# Patient Record
Sex: MALE | Race: WHITE | NOT HISPANIC OR LATINO
[De-identification: names, ages, dates, MRNs, and addresses within clinical notes are randomized per-mention and may not be internally consistent; named-entity substitution may affect disease eponyms.]

---

## 2018-02-23 ENCOUNTER — LAB SERVICES (OUTPATIENT)
Dept: OTHER | Age: 78
End: 2018-02-23

## 2018-03-01 ENCOUNTER — LAB SERVICES (OUTPATIENT)
Dept: OTHER | Age: 78
End: 2018-03-01

## 2018-03-01 ENCOUNTER — CHARTING TRANS (OUTPATIENT)
Dept: OTHER | Age: 78
End: 2018-03-01

## 2018-03-01 ENCOUNTER — IMAGING SERVICES (OUTPATIENT)
Dept: OTHER | Age: 78
End: 2018-03-01

## 2018-03-01 LAB
ALBUMIN SERPL BCG-MCNC: 4.2 G/DL (ref 3.6–5.1)
ALP SERPL-CCNC: 64 U/L (ref 30–130)
ALT SERPL W/O P-5'-P-CCNC: 20 U/L (ref 10–35)
AP REPORT: NORMAL
AST SERPL-CCNC: 15 U/L (ref 9–37)
BASOPHIL %: 1.1 % (ref 0–1.2)
BASOPHIL ABSOLUTE #: 0.1 10*3/UL (ref 0–0.1)
BILIRUB SERPL-MCNC: 0.7 MG/DL (ref 0–1)
BILIRUBIN URINE: NEGATIVE
BLOOD URINE: ABNORMAL
BUN SERPL-MCNC: 20 MG/DL (ref 6–27)
CALCIUM SERPL-MCNC: 9.6 MG/DL (ref 8.6–10.6)
CHLORIDE SERPL-SCNC: 98 MMOL/L (ref 96–107)
CLARITY: CLEAR
COLOR: YELLOW
CREATININE, SERUM: 1.2 MG/DL (ref 0.6–1.6)
DIFFERENTIAL TYPE: NORMAL
EOSINOPHIL %: 2.8 % (ref 0–10)
EOSINOPHIL ABSOLUTE #: 0.2 10*3/UL (ref 0–0.5)
GFR SERPL CREATININE-BSD FRML MDRD: 57 ML/MIN/{1.73M2}
GFR SERPL CREATININE-BSD FRML MDRD: >60 ML/MIN/{1.73M2}
GLUCOSE QUALITATIVE U: NEGATIVE
GLUCOSE SERPL-MCNC: 100 MG/DL (ref 70–200)
HCO3 SERPL-SCNC: 27 MMOL/L (ref 22–32)
HEMATOCRIT: 44.1 % (ref 40–51)
HEMOGLOBIN: 15.5 G/DL (ref 13.7–17.5)
INTERNATIONAL NORMALIZED RATIO: 0.9
KETONES, URINE: NEGATIVE
LEUKOCYTE ESTERASE URINE: NEGATIVE
LYMPH PERCENT: 21 % (ref 20.5–51.1)
LYMPHOCYTE ABSOLUTE #: 1.7 10*3/UL (ref 1.2–3.4)
MEAN CORPUSCULAR HGB CONCENTRATION: 35.1 % (ref 32–36)
MEAN CORPUSCULAR HGB: 28.9 PG (ref 27–34)
MEAN CORPUSCULAR VOLUME: 82.1 FL (ref 79–95)
MEAN PLATELET VOLUME: 11.5 FL (ref 8.6–12.4)
MONOCYTE ABSOLUTE #: 0.8 10*3/UL (ref 0.2–0.9)
MONOCYTE PERCENT: 9.7 % (ref 4.3–12.9)
NEUTROPHIL ABSOLUTE #: 5.1 10*3/UL (ref 1.4–6.5)
NEUTROPHIL PERCENT: 65.4 % (ref 34–73.5)
NITRITE URINE: NEGATIVE
PH URINE: 5 (ref 5–7)
PLATELET COUNT: 202 10*3/UL (ref 150–400)
POTASSIUM SERPL-SCNC: 4 MMOL/L (ref 3.5–5.3)
PROT SERPL-MCNC: 6.5 G/DL (ref 6.2–8.1)
PROTHROMBIN TIME: 9.4 S (ref 9.5–11.5)
PTT: 23.1 S (ref 24–38)
RED BLOOD CELL COUNT: 5.37 10*6/UL (ref 3.9–5.7)
RED CELL DISTRIBUTION WIDTH: 12.8 % (ref 11.3–14.8)
SODIUM SERPL-SCNC: 138 MMOL/L (ref 136–146)
SPECIFIC GRAVITY URINE: 1.02 (ref 1–1.03)
URINE PROTEIN, QUAL (DIPSTICK): NEGATIVE
UROBILINOGEN URINE: <2
WHITE BLOOD CELL COUNT: 7.9 10*3/UL (ref 4–10)

## 2018-03-02 ENCOUNTER — CHARTING TRANS (OUTPATIENT)
Dept: OTHER | Age: 78
End: 2018-03-02

## 2018-03-02 LAB — FINAL REPORT: NORMAL

## 2018-03-03 LAB
CANCER AG15-3 SERPL-ACNC: 6 UNITS/ML (ref 0–32)
CANCER AG27-29 SERPL-ACNC: 10.4 UNITS/ML (ref 0–40)

## 2018-03-05 ENCOUNTER — CHARTING TRANS (OUTPATIENT)
Dept: OTHER | Age: 78
End: 2018-03-05

## 2018-03-06 ENCOUNTER — CHARTING TRANS (OUTPATIENT)
Dept: OTHER | Age: 78
End: 2018-03-06

## 2018-03-06 ENCOUNTER — LAB SERVICES (OUTPATIENT)
Dept: OTHER | Age: 78
End: 2018-03-06

## 2018-03-09 ENCOUNTER — LAB SERVICES (OUTPATIENT)
Dept: OTHER | Age: 78
End: 2018-03-09

## 2018-03-14 ENCOUNTER — IMAGING SERVICES (OUTPATIENT)
Dept: OTHER | Age: 78
End: 2018-03-14

## 2018-03-16 ENCOUNTER — CHARTING TRANS (OUTPATIENT)
Dept: OTHER | Age: 78
End: 2018-03-16

## 2018-03-16 ENCOUNTER — ANCILLARY ORDERS (OUTPATIENT)
Dept: OTHER | Age: 78
End: 2018-03-16

## 2018-03-16 DIAGNOSIS — C50.111 MALIGNANT NEOPLASM OF CENTRAL PORTION OF RIGHT FEMALE BREAST (CMD): ICD-10-CM

## 2018-03-19 LAB — TXT: NORMAL

## 2018-03-20 LAB — SEND OUT RESULTS: NORMAL

## 2018-03-22 ENCOUNTER — CHARTING TRANS (OUTPATIENT)
Dept: OTHER | Age: 78
End: 2018-03-22

## 2018-04-27 ENCOUNTER — CHARTING TRANS (OUTPATIENT)
Dept: OTHER | Age: 78
End: 2018-04-27

## 2018-06-29 ENCOUNTER — APPOINTMENT (OUTPATIENT)
Dept: GENERAL RADIOLOGY | Facility: HOSPITAL | Age: 78
DRG: 682 | End: 2018-06-29
Attending: EMERGENCY MEDICINE
Payer: MEDICARE

## 2018-06-29 ENCOUNTER — HOSPITAL ENCOUNTER (INPATIENT)
Facility: HOSPITAL | Age: 78
LOS: 6 days | Discharge: NURSING, REHAB OR LONG-TERM CARE | DRG: 682 | End: 2018-07-05
Attending: EMERGENCY MEDICINE | Admitting: INTERNAL MEDICINE
Payer: MEDICARE

## 2018-06-29 ENCOUNTER — APPOINTMENT (OUTPATIENT)
Dept: CT IMAGING | Facility: HOSPITAL | Age: 78
DRG: 682 | End: 2018-06-29
Attending: EMERGENCY MEDICINE
Payer: MEDICARE

## 2018-06-29 DIAGNOSIS — N39.0 URINARY TRACT INFECTION WITHOUT HEMATURIA, SITE UNSPECIFIED: ICD-10-CM

## 2018-06-29 DIAGNOSIS — N17.9 AKI (ACUTE KIDNEY INJURY) (HCC): ICD-10-CM

## 2018-06-29 DIAGNOSIS — E86.0 DEHYDRATION: ICD-10-CM

## 2018-06-29 DIAGNOSIS — R41.82 ALTERED MENTAL STATUS, UNSPECIFIED ALTERED MENTAL STATUS TYPE: Primary | ICD-10-CM

## 2018-06-29 LAB
ALBUMIN SERPL BCP-MCNC: 2.2 G/DL (ref 3.5–4.8)
ALP SERPL-CCNC: 136 U/L (ref 32–100)
ALT SERPL-CCNC: 45 U/L (ref 17–63)
AMMONIA PLAS-MCNC: 55 UG/DL (ref 28.2–80.4)
ANION GAP SERPL CALC-SCNC: 9 MMOL/L (ref 0–18)
AST SERPL-CCNC: 33 U/L (ref 15–41)
BASOPHILS # BLD: 0 K/UL (ref 0–0.2)
BASOPHILS NFR BLD: 0 %
BILIRUB DIRECT SERPL-MCNC: 0 MG/DL (ref 0–0.2)
BILIRUB SERPL-MCNC: 0.5 MG/DL (ref 0.3–1.2)
BILIRUB UR QL: NEGATIVE
BUN SERPL-MCNC: 105 MG/DL (ref 8–20)
BUN/CREAT SERPL: 47.7 (ref 10–20)
CALCIUM SERPL-MCNC: 7.9 MG/DL (ref 8.5–10.5)
CHLORIDE SERPL-SCNC: 115 MMOL/L (ref 95–110)
CO2 SERPL-SCNC: 20 MMOL/L (ref 22–32)
COLOR UR: YELLOW
CREAT SERPL-MCNC: 2.2 MG/DL (ref 0.5–1.5)
EOSINOPHIL # BLD: 0.1 K/UL (ref 0–0.7)
EOSINOPHIL NFR BLD: 2 %
ERYTHROCYTE [DISTWIDTH] IN BLOOD BY AUTOMATED COUNT: 22.2 % (ref 11–15)
ETHANOL SERPL-MCNC: 0 MG/DL
GLUCOSE BLDC GLUCOMTR-MCNC: 105 MG/DL (ref 70–99)
GLUCOSE BLDC GLUCOMTR-MCNC: 133 MG/DL (ref 70–99)
GLUCOSE BLDC GLUCOMTR-MCNC: 83 MG/DL (ref 70–99)
GLUCOSE SERPL-MCNC: 89 MG/DL (ref 70–99)
GLUCOSE UR-MCNC: NEGATIVE MG/DL
HCT VFR BLD AUTO: 27.8 % (ref 41–52)
HGB BLD-MCNC: 9 G/DL (ref 13.5–17.5)
KETONES UR-MCNC: NEGATIVE MG/DL
LYMPHOCYTES # BLD: 0.4 K/UL (ref 1–4)
LYMPHOCYTES NFR BLD: 8 %
MCH RBC QN AUTO: 24.9 PG (ref 27–32)
MCHC RBC AUTO-ENTMCNC: 32.3 G/DL (ref 32–37)
MCV RBC AUTO: 77 FL (ref 80–100)
MONOCYTES # BLD: 0.3 K/UL (ref 0–1)
MONOCYTES NFR BLD: 6 %
MRSA DNA SPEC QL NAA+PROBE: NEGATIVE
NEUTROPHILS # BLD AUTO: 4.6 K/UL (ref 1.8–7.7)
NEUTROPHILS NFR BLD: 84 %
NITRITE UR QL STRIP.AUTO: NEGATIVE
OSMOLALITY UR CALC.SUM OF ELEC: 330 MOSM/KG (ref 275–295)
PH UR: 5 [PH] (ref 5–8)
PLATELET # BLD AUTO: 103 K/UL (ref 140–400)
PMV BLD AUTO: 8.3 FL (ref 7.4–10.3)
POTASSIUM SERPL-SCNC: 5.5 MMOL/L (ref 3.3–5.1)
PROT SERPL-MCNC: 7.9 G/DL (ref 5.9–8.4)
PROT UR-MCNC: NEGATIVE MG/DL
RBC # BLD AUTO: 3.6 M/UL (ref 4.5–5.9)
RBC #/AREA URNS AUTO: 2 /HPF
SODIUM SERPL-SCNC: 144 MMOL/L (ref 136–144)
SP GR UR STRIP: 1.01 (ref 1–1.03)
UROBILINOGEN UR STRIP-ACNC: <2
VIT C UR-MCNC: 20 MG/DL
WBC # BLD AUTO: 5.5 K/UL (ref 4–11)
WBC #/AREA URNS AUTO: 92 /HPF

## 2018-06-29 PROCEDURE — 71045 X-RAY EXAM CHEST 1 VIEW: CPT | Performed by: EMERGENCY MEDICINE

## 2018-06-29 PROCEDURE — 99223 1ST HOSP IP/OBS HIGH 75: CPT | Performed by: INTERNAL MEDICINE

## 2018-06-29 PROCEDURE — 70450 CT HEAD/BRAIN W/O DYE: CPT | Performed by: EMERGENCY MEDICINE

## 2018-06-29 RX ORDER — ATORVASTATIN CALCIUM 40 MG/1
40 TABLET, FILM COATED ORAL NIGHTLY
Status: DISCONTINUED | OUTPATIENT
Start: 2018-06-29 | End: 2018-07-05

## 2018-06-29 RX ORDER — ACETAMINOPHEN 500 MG
500 TABLET ORAL EVERY 6 HOURS PRN
Status: DISCONTINUED | OUTPATIENT
Start: 2018-06-29 | End: 2018-06-29

## 2018-06-29 RX ORDER — MULTIPLE VITAMINS W/ MINERALS TAB 9MG-400MCG
1 TAB ORAL DAILY
Status: DISCONTINUED | OUTPATIENT
Start: 2018-06-29 | End: 2018-07-05

## 2018-06-29 RX ORDER — LISINOPRIL 10 MG/1
10 TABLET ORAL DAILY
COMMUNITY

## 2018-06-29 RX ORDER — ACETAMINOPHEN 500 MG
500 TABLET ORAL EVERY 6 HOURS PRN
COMMUNITY

## 2018-06-29 RX ORDER — CARVEDILOL 12.5 MG/1
12.5 TABLET ORAL 2 TIMES DAILY WITH MEALS
COMMUNITY

## 2018-06-29 RX ORDER — ATORVASTATIN CALCIUM 40 MG/1
40 TABLET, FILM COATED ORAL NIGHTLY
COMMUNITY

## 2018-06-29 RX ORDER — SODIUM CHLORIDE 9 MG/ML
INJECTION, SOLUTION INTRAVENOUS CONTINUOUS
Status: DISCONTINUED | OUTPATIENT
Start: 2018-06-29 | End: 2018-06-30

## 2018-06-29 RX ORDER — FUROSEMIDE 80 MG
80 TABLET ORAL 2 TIMES DAILY
Status: ON HOLD | COMMUNITY
End: 2018-07-05

## 2018-06-29 RX ORDER — POLYETHYLENE GLYCOL 3350 17 G/17G
17 POWDER, FOR SOLUTION ORAL
COMMUNITY

## 2018-06-29 RX ORDER — MELATONIN
325
COMMUNITY

## 2018-06-29 RX ORDER — MULTIVIT-MIN/IRON FUM/FOLIC AC 7.5 MG-4
1 TABLET ORAL DAILY
COMMUNITY

## 2018-06-29 RX ORDER — SODIUM CHLORIDE 9 MG/ML
INJECTION, SOLUTION INTRAVENOUS
Status: COMPLETED
Start: 2018-06-29 | End: 2018-06-29

## 2018-06-29 RX ORDER — SODIUM CHLORIDE 9 MG/ML
INJECTION, SOLUTION INTRAVENOUS
Status: DISPENSED
Start: 2018-06-29 | End: 2018-06-30

## 2018-06-29 RX ORDER — INSULIN LISPRO 100 [IU]/ML
5 INJECTION, SOLUTION INTRAVENOUS; SUBCUTANEOUS
COMMUNITY

## 2018-06-29 RX ORDER — 0.9 % SODIUM CHLORIDE 0.9 %
VIAL (ML) INJECTION
Status: COMPLETED
Start: 2018-06-29 | End: 2018-06-29

## 2018-06-29 RX ORDER — GABAPENTIN 100 MG/1
200 CAPSULE ORAL EVERY 8 HOURS
COMMUNITY

## 2018-06-29 RX ORDER — ACETAMINOPHEN 325 MG/1
650 TABLET ORAL EVERY 6 HOURS PRN
Status: DISCONTINUED | OUTPATIENT
Start: 2018-06-29 | End: 2018-07-05

## 2018-06-29 RX ORDER — MELATONIN
325
Status: DISCONTINUED | OUTPATIENT
Start: 2018-06-30 | End: 2018-07-05

## 2018-06-29 NOTE — CONSULTS
[facility]    Report of Consultation    Date of Admission:  6/29/2018  Date of Consult:  6/29/2018   Reason for Consultation:     HODA on CKD stage III     History of Present Illness:     Patient is a 44-year-old nursing home resident with intake or output data in the 24 hours ending 06/29/18 1419  Wt Readings from Last 5 Encounters:  No data found for Wt      General appearance: awake but non conversational   Head: Normocephalic, atraumatic  Eyes: conjunctivae/corneas clear  Neck:  no JVD for acute changes  –Urine toxicology negative  –UA not suggestive of infection.   Urine culture pending  –Chest x-ray not suggestive of infection  –Afebrile and normal white cell count  –Check serum ammonia level      Thank you for allowing me to participat

## 2018-06-29 NOTE — H&P
[facility]    History and Physical    Cheyenne Allen Patient Status:  Inpatient    1940 MRN [id number]   Location [facility] 5SW/SE Attending Solo Queen MD   Hosp Day # 0 PCP Ajay Mitchell MD, Jairo Phillips MD     Date:  / into the skin 3 (three) times daily with meals. Insulin Lispro (HUMALOG PEN SC) Inject into the skin 3 (three) times daily with meals. Sliding scale: if blood sugar 300-349 give 6 units.                                              350-399 give 8 units 06/29/2018   ALB 2.2 (L) 06/29/2018   ALKPHO 136 (H) 06/29/2018   BILT 0.5 06/29/2018   TP 7.9 06/29/2018   AST 33 06/29/2018   ALT 45 06/29/2018   ETOH 0 06/29/2018     Ct Brain Or Head (85277)    Result Date: 6/29/2018  CONCLUSION:  1.  No acute intracran be expected to span two midnight's based on the clinical documentation in H+P. Based on patients current state of illness, I anticipate that, after discharge, patient will require TBD.         Laurence Sullivan MD, Avel Scales MD  6/29/2018

## 2018-06-29 NOTE — ED PROVIDER NOTES
Patient Seen in: [facility] AND [facility] Emergency Department     History   Patient presents with:  Altered Mental Status (neurologic)    Stated Complaint: AMS since noon yesterday     HPI    78year old male sent from nursing home for altered mental status and HENT:   Head: Normocephalic and atraumatic. Head is without raccoon's eyes, without right periorbital erythema and without left periorbital erythema.    Nose: Nose normal.   Mouth/Throat: Mucous membranes are normal. Mucous membranes are not pale and not normal limits   POCT GLUCOSE - Abnormal; Notable for the following:     POC Glucose  105 (*)     All other components within normal limits   CBC W/ DIFFERENTIAL - Abnormal; Notable for the following:     RBC 3.60 (*)     HGB 9.0 (*)     HCT 27.8 (*)     MC ventricles,     cisterns, and sulci are dilated, but remain commensurate in caliber,     consistent with parenchymal volume loss of a degree that is appropriate     for age.  No hydrocephalus, subarachnoid hemorrhage, or effacement of the     basal cisterns noncontrast CT technique. 2. Senescent changes of parenchymal volume loss with sequela of chronic     microvascular ischemic disease. 3. There is large vessel atherosclerosis of the anterior and posterior     intracranial circulation. (ROCEPHIN) 1 g in sodium chloride 0.9 % 100 mL MBP/ADD-vantage (not administered)         Procedures: None     06/29/18  1145 06/29/18  1200 06/29/18  1215 06/29/18  1245   BP: 102/75 101/71 (!) 152/132 (!) 152/92   Pulse: 60 60 64 60   Resp: 20 19 19 19 1330  ------------------------------------------------------------    Impression:  After review and interpretation of the above emergency department workup, the patient was found to have Altered mental status, unspecified altered mental status type  (prima

## 2018-06-29 NOTE — ED NOTES
Pt to ED via stable EMS with c/o AMS since noon yesterday- per EMS, pt normally A/O x 4 but now only to self. For this RN, pt A/O x 2 to self and place. Pt with BS en route of 72- half amp of D50 given- rechecked - now in . Denies pain.

## 2018-06-29 NOTE — CONSULTS
06/29/18  1145 06/29/18  1200 06/29/18  1215 06/29/18  1245   BP: 102/75 101/71 (!) 152/132 (!) 152/92   Pulse: 60 60 64 60   Resp: 20 19 19 19   SpO2: 99%            There is no height or weight on file to calculate BMI.     Patient is  78year old male s Constitutional: He is cooperative. Non-toxic appearance. He does not have a sickly appearance. He does not appear ill. No distress. HENT:   Head: Normocephalic and atraumatic.  Head is without raccoon's eyes, without right periorbital erythema and with

## 2018-06-30 LAB
ALBUMIN SERPL BCP-MCNC: 2.1 G/DL (ref 3.5–4.8)
ANION GAP SERPL CALC-SCNC: 9 MMOL/L (ref 0–18)
ANION GAP SERPL CALC-SCNC: 9 MMOL/L (ref 0–18)
BACTERIA UR QL AUTO: NEGATIVE /HPF
BILIRUB UR QL: NEGATIVE
BUN SERPL-MCNC: 108 MG/DL (ref 8–20)
BUN SERPL-MCNC: 108 MG/DL (ref 8–20)
BUN/CREAT SERPL: 48 (ref 10–20)
BUN/CREAT SERPL: 49.1 (ref 10–20)
CALCIUM SERPL-MCNC: 7.7 MG/DL (ref 8.5–10.5)
CALCIUM SERPL-MCNC: 7.9 MG/DL (ref 8.5–10.5)
CHLORIDE SERPL-SCNC: 119 MMOL/L (ref 95–110)
CHLORIDE SERPL-SCNC: 121 MMOL/L (ref 95–110)
CO2 SERPL-SCNC: 17 MMOL/L (ref 22–32)
CO2 SERPL-SCNC: 18 MMOL/L (ref 22–32)
CREAT SERPL-MCNC: 2.2 MG/DL (ref 0.5–1.5)
CREAT SERPL-MCNC: 2.25 MG/DL (ref 0.5–1.5)
ERYTHROCYTE [DISTWIDTH] IN BLOOD BY AUTOMATED COUNT: 22.1 % (ref 11–15)
GLUCOSE BLDC GLUCOMTR-MCNC: 111 MG/DL (ref 70–99)
GLUCOSE BLDC GLUCOMTR-MCNC: 119 MG/DL (ref 70–99)
GLUCOSE BLDC GLUCOMTR-MCNC: 183 MG/DL (ref 70–99)
GLUCOSE SERPL-MCNC: 121 MG/DL (ref 70–99)
GLUCOSE SERPL-MCNC: 169 MG/DL (ref 70–99)
GLUCOSE UR-MCNC: NEGATIVE MG/DL
HBA1C MFR BLD: 9.2 % (ref 4–6)
HCT VFR BLD AUTO: 29.4 % (ref 41–52)
HGB BLD-MCNC: 9.2 G/DL (ref 13.5–17.5)
KETONES UR-MCNC: NEGATIVE MG/DL
MCH RBC QN AUTO: 24.1 PG (ref 27–32)
MCHC RBC AUTO-ENTMCNC: 31.5 G/DL (ref 32–37)
MCV RBC AUTO: 76.5 FL (ref 80–100)
NITRITE UR QL STRIP.AUTO: NEGATIVE
OSMOLALITY UR CALC.SUM OF ELEC: 339 MOSM/KG (ref 275–295)
OSMOLALITY UR CALC.SUM OF ELEC: 340 MOSM/KG (ref 275–295)
PH UR: 5 [PH] (ref 5–8)
PHOSPHATE SERPL-MCNC: 6.2 MG/DL (ref 2.4–4.7)
PLATELET # BLD AUTO: 98 K/UL (ref 140–400)
PMV BLD AUTO: 8.9 FL (ref 7.4–10.3)
POTASSIUM SERPL-SCNC: 5.4 MMOL/L (ref 3.3–5.1)
POTASSIUM SERPL-SCNC: 5.7 MMOL/L (ref 3.3–5.1)
PROT UR-MCNC: 100 MG/DL
RBC # BLD AUTO: 3.84 M/UL (ref 4.5–5.9)
RBC #/AREA URNS AUTO: 1922 /HPF
SODIUM SERPL-SCNC: 146 MMOL/L (ref 136–144)
SODIUM SERPL-SCNC: 147 MMOL/L (ref 136–144)
SP GR UR STRIP: 1.01 (ref 1–1.03)
UROBILINOGEN UR STRIP-ACNC: <2
VIT C UR-MCNC: NEGATIVE MG/DL
WBC # BLD AUTO: 5.5 K/UL (ref 4–11)
WBC #/AREA URNS AUTO: 2214 /HPF

## 2018-06-30 PROCEDURE — 4A00X4Z MEASUREMENT OF CENTRAL NERVOUS ELECTRICAL ACTIVITY, EXTERNAL APPROACH: ICD-10-PCS | Performed by: OTHER

## 2018-06-30 PROCEDURE — 99233 SBSQ HOSP IP/OBS HIGH 50: CPT | Performed by: INTERNAL MEDICINE

## 2018-06-30 RX ORDER — SODIUM POLYSTYRENE SULFONATE 15 G/60ML
15 SUSPENSION ORAL; RECTAL ONCE
Status: DISCONTINUED | OUTPATIENT
Start: 2018-06-30 | End: 2018-07-02 | Stop reason: ALTCHOICE

## 2018-06-30 RX ORDER — DEXTROSE AND SODIUM CHLORIDE 5; .45 G/100ML; G/100ML
INJECTION, SOLUTION INTRAVENOUS CONTINUOUS
Status: DISCONTINUED | OUTPATIENT
Start: 2018-06-30 | End: 2018-07-02

## 2018-06-30 NOTE — PROGRESS NOTES
06/30/18  0515 06/30/18  0530 06/30/18  0545 06/30/18  0600   BP:       Pulse: 62 60 60 60   Resp:       Temp:       TempSrc:       SpO2:       Weight:    276 lb 3.2 oz (125.3 kg)   Height:             Body mass index is 35.46 kg/m².     Patient is sleepy

## 2018-06-30 NOTE — PLAN OF CARE
Problem: Patient Centered Care  Goal: Patient preferences are identified and integrated in the patient's plan of care  Interventions:  - What would you like us to know as we care for you?   - Provide timely, complete, and accurate information to patient/fa pain and pain management  - Manage/alleviate anxiety  - Utilize distraction and/or relaxation techniques  - Monitor for opioid side effects  - Notify MD/LIP if interventions unsuccessful or patient reports new pain  - Anticipate increased pain with activit functional status, cognitive ability or social support system   Outcome: Not Progressing      Comments: Wellington Crawford is awake but oriented to self, refusing to change position and refuse other treatment. Refused to eat.  MRI of the brain can not be done till [facility]

## 2018-06-30 NOTE — PLAN OF CARE
Problem: Patient Centered Care  Goal: Patient preferences are identified and integrated in the patient's plan of care  Interventions:  - What would you like us to know as we care for you?   - Provide timely, complete, and accurate information to patient/fa Monitor for opioid side effects  - Notify MD/LIP if interventions unsuccessful or patient reports new pain  - Anticipate increased pain with activity and pre-medicate as appropriate  Outcome: Progressing  No c/o pain.     Problem: SAFETY ADULT - FALL  Goal:

## 2018-06-30 NOTE — PROGRESS NOTES
[place] [facility]    Progress Note    Cary Santos Patient Status:  Inpatient    1940 MRN [id number]   Location [facility] 5SW/SE Attending Brianna Mckinnon MD   Hosp Day # 1 PCP ELIJAH XAVIER [facility] MD, Violeta Dc MD     Subjective:     Kathleen Amin intracranial process by noncontrast CT technique. 2. Senescent changes of parenchymal volume loss with sequela of chronic microvascular ischemic disease. 3. There is large vessel atherosclerosis of the anterior and posterior intracranial circulation.   4.

## 2018-06-30 NOTE — PROGRESS NOTES
[place] [facility]    Progress Note      Subjective:     Patient non verbal - likely the baseline per NH       Review of Systems:     Unable to obtain - non conversational     Objective:   Temp:  [97 °F (36.1 °C)] 97 °F (36.1 °C)  Pulse:  [59-79] 147*   K  5.5*  5.7*   CL  115*  121*   CO2  20*  17*   ALKPHO  136*   --    AST  33   --    ALT  45   --    BILT  0.5   --    TP  7.9   --      No results found for: PTT, INR  No results for input(s): BNP in the last 168 hours.   Recent Labs   Lab  06/30/ ventricular pacemaker atrial fibrillation No previous ECGs available Electronically signed on 06/29/2018 at 12:29 by Merced Denson MD      Assessment and Plan:      1. HODA on CKD stage III  –Baseline creatinine unknown  – bun/creatinine 105/2.2 mg/dL.   Mary Nina

## 2018-07-01 LAB
GLUCOSE BLDC GLUCOMTR-MCNC: 149 MG/DL (ref 70–99)
GLUCOSE BLDC GLUCOMTR-MCNC: 170 MG/DL (ref 70–99)
GLUCOSE BLDC GLUCOMTR-MCNC: 193 MG/DL (ref 70–99)

## 2018-07-01 PROCEDURE — 99233 SBSQ HOSP IP/OBS HIGH 50: CPT | Performed by: INTERNAL MEDICINE

## 2018-07-01 NOTE — PROGRESS NOTES
[facility]    Progress Note    Cleveland Come Patient Status:  Inpatient    1940 MRN [id number]   Location [facility] 5SW/SE Attending Mick Navarro MD   Hosp Day # 2 PCP Roberth Sepulveda MD, Jesse Varghese MD     Subjective:     Fariba Jones disease. 3. There is large vessel atherosclerosis of the anterior and posterior intracranial circulation. 4. 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 5. Lesser incidental findings as above.     Dictated by (CST): Ismael Cardoso,

## 2018-07-01 NOTE — PROGRESS NOTES
[place] [facility]    Progress Note      Subjective:     Patient verbal today - state wants to see a doctor but wouldn't elaborate. Confused       Review of Systems:     Limited.  Denies any cp, sob, abdominal pain     Objective:   Temp:  [97.3 °F 2.20*  2.25*   GFRAA  33*  33*  32*   GFRNAA  28*  28*  27*   CA  7.9*  7.9*  7.7*   ALB  2.2*  2.1*   --    NA  144  147*  146*   K  5.5*  5.7*  5.4*   CL  115*  121*  119*   CO2  20*  17*  18*   ALKPHO  136*   --    --    AST  33   --    --    ALT  45

## 2018-07-01 NOTE — PLAN OF CARE
Problem: Patient Centered Care  Goal: Patient preferences are identified and integrated in the patient's plan of care  Interventions:  - What would you like us to know as we care for you?   - Provide timely, complete, and accurate information to patient/fa severity of pain and evaluate response  - Implement non-pharmacological measures as appropriate and evaluate response  - Consider cultural and social influences on pain and pain management  - Manage/alleviate anxiety  - Utilize distraction and/or relaxatio Complete POLST form as appropriate  - Assess patient's ability to be responsible for managing their own health  - Refer to Case Management Department for coordinating discharge planning if the patient needs post-hospital services based on physician/LIP ord

## 2018-07-02 LAB
ANION GAP SERPL CALC-SCNC: 8 MMOL/L (ref 0–18)
BUN SERPL-MCNC: 95 MG/DL (ref 8–20)
BUN/CREAT SERPL: 42 (ref 10–20)
CALCIUM SERPL-MCNC: 8.1 MG/DL (ref 8.5–10.5)
CHLORIDE SERPL-SCNC: 123 MMOL/L (ref 95–110)
CO2 SERPL-SCNC: 20 MMOL/L (ref 22–32)
CREAT SERPL-MCNC: 2.26 MG/DL (ref 0.5–1.5)
GLUCOSE BLDC GLUCOMTR-MCNC: 173 MG/DL (ref 70–99)
GLUCOSE BLDC GLUCOMTR-MCNC: 182 MG/DL (ref 70–99)
GLUCOSE BLDC GLUCOMTR-MCNC: 196 MG/DL (ref 70–99)
GLUCOSE BLDC GLUCOMTR-MCNC: 254 MG/DL (ref 70–99)
GLUCOSE SERPL-MCNC: 176 MG/DL (ref 70–99)
OSMOLALITY UR CALC.SUM OF ELEC: 346 MOSM/KG (ref 275–295)
POTASSIUM SERPL-SCNC: 4.7 MMOL/L (ref 3.3–5.1)
SODIUM SERPL-SCNC: 151 MMOL/L (ref 136–144)

## 2018-07-02 PROCEDURE — 99233 SBSQ HOSP IP/OBS HIGH 50: CPT | Performed by: INTERNAL MEDICINE

## 2018-07-02 RX ORDER — DEXTROSE MONOHYDRATE 50 MG/ML
INJECTION, SOLUTION INTRAVENOUS CONTINUOUS
Status: DISCONTINUED | OUTPATIENT
Start: 2018-07-02 | End: 2018-07-05

## 2018-07-02 RX ORDER — 0.9 % SODIUM CHLORIDE 0.9 %
VIAL (ML) INJECTION
Status: COMPLETED
Start: 2018-07-02 | End: 2018-07-02

## 2018-07-02 NOTE — PROGRESS NOTES
07/02/18  0546 07/02/18  0600 07/02/18  0815 07/02/18  1558   BP: 129/65  154/64 147/76   Pulse: 60  62 61   Resp: 20  20 18   Temp: 97.4 °F (36.3 °C)  97.8 °F (36.6 °C) 97.8 °F (36.6 °C)   TempSrc: Oral  Axillary Axillary   SpO2: 98%  99% 94%   Weight:

## 2018-07-02 NOTE — PLAN OF CARE
Problem: Diabetes/Glucose Control  Goal: Glucose maintained within prescribed range  INTERVENTIONS:  - Monitor Blood Glucose as ordered  - Assess for signs and symptoms of hyperglycemia and hypoglycemia  - Administer ordered medications to maintain glucose patient safety including physical limitations  - Instruct pt to call for assistance with activity based on assessment  - Modify environment to reduce risk of injury  - Provide assistive devices as appropriate  - Consider OT/PT consult to assist with streng

## 2018-07-02 NOTE — PROGRESS NOTES
[facility]    Progress Note      Subjective:     Patient refusing to eat       Review of Systems:     Limited.  Denies any cp, sob, abdominal pain     Objective:   Temp:  [97.4 °F (36.3 °C)-97.8 °F (36.6 °C)] 97.8 °F (36.6 °C)  Pulse:  [59 32*  32*   GFRNAA  28*  28*  27*  27*   CA  7.9*  7.9*  7.7*  8.1*   ALB  2.2*  2.1*   --    --    NA  144  147*  146*  151*   K  5.5*  5.7*  5.4*  4.7   CL  115*  121*  119*  123*   CO2  20*  17*  18*  20*   ALKPHO  136*   --    --    --    AST  33   --

## 2018-07-02 NOTE — WOUND PROGRESS NOTE
WOUND CARE NOTE      PLAN   Recommendations:  Dietary consult for recommendations for nutrition to optimize wound healing  Diabetic Education for optimal glucose control  Turn schedules  Heels elevated using pillows, heel wedge or heel boots to offload h hours and bedside RN to measure for 1 layer tubigrip to be applied from toes to knees. Discussed recommendations with Dr. Davidson Peña, received orders. Pt tolerated assessment and treatment well. Allergies: Patient has no known allergies.     Labs:     Lab R

## 2018-07-02 NOTE — DIETARY NOTE
ADULT NUTRITION INITIAL ASSESSMENT    Pt is at moderate nutrition risk. Pt does not meet malnutrition criteria. RECOMMENDATIONS TO MD:  See Nutrition Intervention for care plans.       NUTRITION DIAGNOSIS/PROBLEM:  Increased nutrient needs related to UBW  WEIGHT HISTORY:  Wt Readings from Last 6 Encounters:  07/02/18 : 121.4 kg (267 lb 9.6 oz)    Patient Weight(s) for the past 336 hrs:   Weight   07/02/18 0600 121.4 kg (267 lb 9.6 oz)   07/01/18 0600 122.2 kg (269 lb 4.8 oz)   06/30/18 0600 125.3 kg (2 adequacy of supplement intake.   - Anthropometric Measurement:   Monitor: wt and wt change  - Nutrition Goals: allow wt loss due to fluid losses, PO and supplement greater than 75% of needs, labs WNL and support wound healing    DIETITIAN FOLLOW UP:  RD to

## 2018-07-02 NOTE — PROGRESS NOTES
Patient is alert to self only,PCT attempted to feed patient but he refused. Blood sugar this am was 182, will reattempts to feed patient within the hour.

## 2018-07-02 NOTE — PROCEDURES
10-20 electrode placement  Bipolar and referential montages    Background rhythm 3-6Hz with severe disorganization  No focal slowing  No seizure activity  Photic stimulation and hyperventilation were not done.     IMP: This EEG shows severe slowing and diso

## 2018-07-02 NOTE — CM/SW NOTE
SW confirmed that pt is LTC at [facility]. Clinical updates have been sent to facility.     Jannie Roberts, [address]

## 2018-07-03 ENCOUNTER — APPOINTMENT (OUTPATIENT)
Dept: ULTRASOUND IMAGING | Facility: HOSPITAL | Age: 78
DRG: 682 | End: 2018-07-03
Attending: INTERNAL MEDICINE
Payer: MEDICARE

## 2018-07-03 LAB
ALBUMIN SERPL BCP-MCNC: 2.1 G/DL (ref 3.5–4.8)
ANION GAP SERPL CALC-SCNC: 6 MMOL/L (ref 0–18)
ANION GAP SERPL CALC-SCNC: 6 MMOL/L (ref 0–18)
BUN SERPL-MCNC: 71 MG/DL (ref 8–20)
BUN SERPL-MCNC: 80 MG/DL (ref 8–20)
BUN/CREAT SERPL: 34.8 (ref 10–20)
BUN/CREAT SERPL: 37.7 (ref 10–20)
CALCIUM SERPL-MCNC: 8.1 MG/DL (ref 8.5–10.5)
CALCIUM SERPL-MCNC: 8.3 MG/DL (ref 8.5–10.5)
CHLORIDE SERPL-SCNC: 123 MMOL/L (ref 95–110)
CHLORIDE SERPL-SCNC: 123 MMOL/L (ref 95–110)
CO2 SERPL-SCNC: 20 MMOL/L (ref 22–32)
CO2 SERPL-SCNC: 20 MMOL/L (ref 22–32)
CREAT SERPL-MCNC: 2.04 MG/DL (ref 0.5–1.5)
CREAT SERPL-MCNC: 2.12 MG/DL (ref 0.5–1.5)
GLUCOSE BLDC GLUCOMTR-MCNC: 182 MG/DL (ref 70–99)
GLUCOSE BLDC GLUCOMTR-MCNC: 189 MG/DL (ref 70–99)
GLUCOSE BLDC GLUCOMTR-MCNC: 205 MG/DL (ref 70–99)
GLUCOSE BLDC GLUCOMTR-MCNC: 220 MG/DL (ref 70–99)
GLUCOSE SERPL-MCNC: 185 MG/DL (ref 70–99)
GLUCOSE SERPL-MCNC: 204 MG/DL (ref 70–99)
OSMOLALITY UR CALC.SUM OF ELEC: 335 MOSM/KG (ref 275–295)
OSMOLALITY UR CALC.SUM OF ELEC: 337 MOSM/KG (ref 275–295)
PHOSPHATE SERPL-MCNC: 3.9 MG/DL (ref 2.4–4.7)
POTASSIUM SERPL-SCNC: 4.2 MMOL/L (ref 3.3–5.1)
POTASSIUM SERPL-SCNC: 4.2 MMOL/L (ref 3.3–5.1)
SODIUM SERPL-SCNC: 149 MMOL/L (ref 136–144)
SODIUM SERPL-SCNC: 149 MMOL/L (ref 136–144)

## 2018-07-03 PROCEDURE — 76770 US EXAM ABDO BACK WALL COMP: CPT | Performed by: INTERNAL MEDICINE

## 2018-07-03 PROCEDURE — 99233 SBSQ HOSP IP/OBS HIGH 50: CPT | Performed by: INTERNAL MEDICINE

## 2018-07-03 RX ORDER — 0.9 % SODIUM CHLORIDE 0.9 %
VIAL (ML) INJECTION
Status: COMPLETED
Start: 2018-07-03 | End: 2018-07-03

## 2018-07-03 RX ORDER — CARVEDILOL 12.5 MG/1
12.5 TABLET ORAL 2 TIMES DAILY WITH MEALS
Status: DISCONTINUED | OUTPATIENT
Start: 2018-07-03 | End: 2018-07-05

## 2018-07-03 NOTE — PROGRESS NOTES
[facility]DA[facility] GONZALE[facility] - [facility]    Progress Note      Subjective:     Ate very small amount of meal today       Review of Systems:     Limited.  Denies any cp, sob, abdominal pain     Objective:   Temp:  [97.4 °F (36.3 °C)-97.8 °F (36.6 °C)] 97.6 °F (36.4 °C) 2.20*  2.25*  2.26*  2.12*   GFRAA  33*  33*  32*  32*  34*   GFRNAA  28*  28*  27*  27*  30*   CA  7.9*  7.9*  7.7*  8.1*  8.1*   ALB  2.2*  2.1*   --    --   2.1*   NA  144  147*  146*  151*  149*   K  5.5*  5.7*  5.4*  4.7  4.2   CL  115*  121*  119*  1

## 2018-07-03 NOTE — PROGRESS NOTES
[place] [facility] - [facility]    Progress Note    Haley Wells Patient Status:  Inpatient    1940 MRN [id number]   Location [facility] 5SW/SE Attending Veronica Perkins MD   Hosp Day # 4 PCP Joe Avery MD, Kevin Quiñonez MD     Subjective:     Evelyn Wolff

## 2018-07-03 NOTE — PLAN OF CARE
Problem: NEUROLOGICAL - ADULT  Goal: Achieves stable or improved neurological status  INTERVENTIONS  - Assess for and report changes in neurological status  Outcome: Progressing  PT REMAINS CONFUSED, MORE AWAKE AND CAN VERBALIZE NEEDS.

## 2018-07-03 NOTE — PROGRESS NOTES
[facility]    Progress Note    Graciela Negrete Patient Status:  Inpatient    1940 MRN [id number]   Location [facility] 5SW/SE Attending Froylan Campbell MD   Hosp Day # 3 PCP Santiago Gudino MD, Mookie Sullivan MD     Subjective:     Laine Bae

## 2018-07-04 ENCOUNTER — APPOINTMENT (OUTPATIENT)
Dept: GENERAL RADIOLOGY | Facility: HOSPITAL | Age: 78
DRG: 682 | End: 2018-07-04
Attending: INTERNAL MEDICINE
Payer: MEDICARE

## 2018-07-04 ENCOUNTER — APPOINTMENT (OUTPATIENT)
Dept: ULTRASOUND IMAGING | Facility: HOSPITAL | Age: 78
DRG: 682 | End: 2018-07-04
Attending: INTERNAL MEDICINE
Payer: MEDICARE

## 2018-07-04 LAB
ANION GAP SERPL CALC-SCNC: 6 MMOL/L (ref 0–18)
BUN SERPL-MCNC: 65 MG/DL (ref 8–20)
BUN/CREAT SERPL: 32.7 (ref 10–20)
CALCIUM SERPL-MCNC: 8.3 MG/DL (ref 8.5–10.5)
CHLORIDE SERPL-SCNC: 123 MMOL/L (ref 95–110)
CO2 SERPL-SCNC: 20 MMOL/L (ref 22–32)
CREAT SERPL-MCNC: 1.99 MG/DL (ref 0.5–1.5)
GLUCOSE BLDC GLUCOMTR-MCNC: 171 MG/DL (ref 70–99)
GLUCOSE BLDC GLUCOMTR-MCNC: 191 MG/DL (ref 70–99)
GLUCOSE BLDC GLUCOMTR-MCNC: 204 MG/DL (ref 70–99)
GLUCOSE BLDC GLUCOMTR-MCNC: 211 MG/DL (ref 70–99)
GLUCOSE SERPL-MCNC: 215 MG/DL (ref 70–99)
OSMOLALITY UR CALC.SUM OF ELEC: 333 MOSM/KG (ref 275–295)
POTASSIUM SERPL-SCNC: 4 MMOL/L (ref 3.3–5.1)
SODIUM SERPL-SCNC: 149 MMOL/L (ref 136–144)

## 2018-07-04 PROCEDURE — 71045 X-RAY EXAM CHEST 1 VIEW: CPT | Performed by: INTERNAL MEDICINE

## 2018-07-04 PROCEDURE — 99233 SBSQ HOSP IP/OBS HIGH 50: CPT | Performed by: INTERNAL MEDICINE

## 2018-07-04 PROCEDURE — 93971 EXTREMITY STUDY: CPT | Performed by: INTERNAL MEDICINE

## 2018-07-04 RX ORDER — FUROSEMIDE 10 MG/ML
40 INJECTION INTRAMUSCULAR; INTRAVENOUS ONCE
Status: COMPLETED | OUTPATIENT
Start: 2018-07-04 | End: 2018-07-04

## 2018-07-04 NOTE — PLAN OF CARE
Focus: RESP  Data: NOTED PT SOB WHEN TURNING IN BED WITH AUDIBLE WHEEZING BUT LUNGS ANTERIOR IS CLEAR. SOB RESOLVES WHEN PT IS RESTING. O2 SAT 75-97% RA. LT ARM MORE SWOLLEN THAN USUAL.    Action: DR. Ron LARSEN NOTIFIED WITH THE ABOVE CHANGES,

## 2018-07-04 NOTE — PROGRESS NOTES
[facility]    Progress Note      Subjective:     Ate very small amount of meal today       Review of Systems:     Limited.  Denies any cp, sob, abdominal pain     Objective:   Temp:  [97.3 °F (36.3 °C)-98 °F (36.7 °C)] 98 °F (36.7 °C)  Pul 185*  204*  215*   BUN  105*  108*   < >  80*  71*  65*   CREATSERUM  2.20*  2.20*   < >  2.12*  2.04*  1.99*   GFRAA  33*  33*   < >  34*  36*  37*   GFRNAA  28*  28*   < >  30*  31*  32*   CA  7.9*  7.9*   < >  8.1*  8.3*  8.3*   ALB  2.2*  2.1*   --   2

## 2018-07-04 NOTE — PLAN OF CARE
Problem: Patient Centered Care  Goal: Patient preferences are identified and integrated in the patient's plan of care  Interventions:  - What would you like us to know as we care for you?   - Provide timely, complete, and accurate information to patient/fa and evaluate response  - Implement non-pharmacological measures as appropriate and evaluate response  - Consider cultural and social influences on pain and pain management  - Manage/alleviate anxiety  - Utilize distraction and/or relaxation techniques  - M needs post-hospital services based on physician/LIP order or complex needs related to functional status, cognitive ability or social support system   Outcome: Progressing  Possible discharge back to [facility] tomorrow    Problem: NEUROLOGICAL - ADULT  Goal

## 2018-07-04 NOTE — PROGRESS NOTES
[facility]    Progress Note    Faith Grier Patient Status:  Inpatient    1940 MRN [id number]   Location [facility] 5SW/SE Attending Chris Castrejon MD   Hosp Day # 5 PCP Albert Huerta MD, Misha Atkins MD     Subjective:     Serene Mcgee

## 2018-07-04 NOTE — PROGRESS NOTES
07/03/18  0441 07/03/18  0450 07/03/18  0821 07/03/18  1630   BP: (!) 163/79  143/76 (!) 163/74   Pulse:   58 60   Resp: 18 18 18   Temp: 97.4 °F (36.3 °C)  97.6 °F (36.4 °C) 98 °F (36.7 °C)   TempSrc: Oral  Oral Oral   SpO2: 95%  98% 98%   Weight:  265

## 2018-07-05 VITALS
TEMPERATURE: 98 F | WEIGHT: 262.88 LBS | OXYGEN SATURATION: 98 % | RESPIRATION RATE: 20 BRPM | BODY MASS INDEX: 33.74 KG/M2 | DIASTOLIC BLOOD PRESSURE: 76 MMHG | SYSTOLIC BLOOD PRESSURE: 155 MMHG | HEIGHT: 74 IN | HEART RATE: 58 BPM

## 2018-07-05 PROBLEM — R41.82 ALTERED MENTAL STATUS: Status: RESOLVED | Noted: 2018-06-29 | Resolved: 2018-07-05

## 2018-07-05 PROBLEM — R41.82 ALTERED MENTAL STATUS, UNSPECIFIED ALTERED MENTAL STATUS TYPE: Status: RESOLVED | Noted: 2018-06-29 | Resolved: 2018-07-05

## 2018-07-05 PROBLEM — E86.0 DEHYDRATION: Status: RESOLVED | Noted: 2018-06-29 | Resolved: 2018-07-05

## 2018-07-05 PROBLEM — N39.0 URINARY TRACT INFECTION WITHOUT HEMATURIA, SITE UNSPECIFIED: Status: RESOLVED | Noted: 2018-06-29 | Resolved: 2018-07-05

## 2018-07-05 LAB
ANION GAP SERPL CALC-SCNC: 4 MMOL/L (ref 0–18)
BNP SERPL-MCNC: 789 PG/ML (ref 0–100)
BUN SERPL-MCNC: 51 MG/DL (ref 8–20)
BUN/CREAT SERPL: 27 (ref 10–20)
CALCIUM SERPL-MCNC: 8.2 MG/DL (ref 8.5–10.5)
CHLORIDE SERPL-SCNC: 120 MMOL/L (ref 95–110)
CO2 SERPL-SCNC: 23 MMOL/L (ref 22–32)
CREAT SERPL-MCNC: 1.89 MG/DL (ref 0.5–1.5)
GLUCOSE BLDC GLUCOMTR-MCNC: 198 MG/DL (ref 70–99)
GLUCOSE SERPL-MCNC: 183 MG/DL (ref 70–99)
OSMOLALITY UR CALC.SUM OF ELEC: 322 MOSM/KG (ref 275–295)
POTASSIUM SERPL-SCNC: 3.9 MMOL/L (ref 3.3–5.1)
SODIUM SERPL-SCNC: 147 MMOL/L (ref 136–144)

## 2018-07-05 PROCEDURE — 99233 SBSQ HOSP IP/OBS HIGH 50: CPT | Performed by: INTERNAL MEDICINE

## 2018-07-05 RX ORDER — 0.9 % SODIUM CHLORIDE 0.9 %
VIAL (ML) INJECTION
Status: COMPLETED
Start: 2018-07-05 | End: 2018-07-05

## 2018-07-05 RX ORDER — FUROSEMIDE 10 MG/ML
40 INJECTION INTRAMUSCULAR; INTRAVENOUS ONCE
Status: COMPLETED | OUTPATIENT
Start: 2018-07-05 | End: 2018-07-05

## 2018-07-05 NOTE — CM/SW NOTE
RN/Sapna informed SW that pt is medically stable for discharge today and will need ambulance transport (complete).  VANESSA/Sapna stated she will call [facility] to arranged ambulance for 1pm.     LAUREN spoke w/ pt's daughter, Sung Ribera and updated her regarding pt'

## 2018-07-05 NOTE — PROGRESS NOTES
Report called to receiving RN at [address]. No changes to medications. RN understands patient condition. RN called daughter Syed Santos and gave update. All questions answered. IV and telemetry removed.

## 2018-07-05 NOTE — PLAN OF CARE
Problem: Patient Centered Care  Goal: Patient preferences are identified and integrated in the patient's plan of care  Interventions:  - What would you like us to know as we care for you?   - Provide timely, complete, and accurate information to patient/fa measures as appropriate and evaluate response  - Consider cultural and social influences on pain and pain management  - Manage/alleviate anxiety  - Utilize distraction and/or relaxation techniques  - Monitor for opioid side effects  - Notify MD/LIP if inte system   Outcome: Progressing  [facility]

## 2018-07-05 NOTE — PROGRESS NOTES
[place] [facility]    Progress Note      Subjective:     Poor to no oral intake. Today sitting in chair. Denies any sob      Review of Systems:     Limited.  Denies any cp, sob, abdominal pain     Objective:   Temp:  [97.9 °F (36.6 °C)] 97.9 °F (3 2122  07/04/18   0655  07/05/18   0848   GLU  89  121*   < >  185*  204*  215*  183*   BUN  105*  108*   < >  80*  71*  65*  51*   CREATSERUM  2.20*  2.20*   < >  2.12*  2.04*  1.99*  1.89*   GFRAA  33*  33*   < >  34*  36*  37*  39*   GFRNAA  28*  28*   < not suggestive of infection. Urine culture no gowth  –Chest x-ray not suggestive of infection  –Afebrile and normal white cell count  –serum ammonia level wnl     Discussed with Nursing.    Patient high risk of readmission for electrolytes imbalances given

## 2018-07-05 NOTE — PHYSICAL THERAPY NOTE
PHYSICAL THERAPY QUICK EVALUATION - INPATIENT    Room Number: 566/566-A  Evaluation Date: 7/5/2018  Presenting Problem: HTN kidney disease w/ CKD stage IV  Physician Order: PT Eval and Treat    Problem List  Active Problems:    Hypertensive kidney diseas wheelchair, bedside commode, etc.): Unable   -   Moving from lying on back to sitting on the side of the bed?: A Lot   How much help from another person does the patient currently need. ..   -   Moving to and from a bed to a chair (including a wheelchair)?: recliner chair w/ chair alarm on and RN present in room. Pt is at his functional mobility baseline. Pt will be d/c from IP PT services. Recommend return to long term care at nursing home.    PT Discharge Recommendations: Long term care    PLAN  Patient ha

## 2018-07-05 NOTE — DISCHARGE SUMMARY
[facility]    Discharge Summary    Christina Cano Patient Status:  Inpatient    1940 MRN [id number]   Location [facility] 5SW/SE Attending Deanna Man MD   Hosp Day # 6 PCP Chrissie Gong MD, Petar Olguin MD     Date of [facility] UNIT/ML Subcutaneous Solution  Inject 5 Units into the skin 3 (three) times daily before meals. !! Insulin Lispro (HUMALOG PEN SC)  Inject 5 Units into the skin 3 (three) times daily with meals.     !! Insulin Lispro (HUMALOG PEN SC)  Inject into the s

## 2018-11-23 ENCOUNTER — IMAGING SERVICES (OUTPATIENT)
Dept: OTHER | Age: 78
End: 2018-11-23

## 2019-01-01 ENCOUNTER — EXTERNAL RECORD (OUTPATIENT)
Dept: HEALTH INFORMATION MANAGEMENT | Facility: OTHER | Age: 79
End: 2019-01-01

## 2019-01-01 ENCOUNTER — TELEPHONE (OUTPATIENT)
Dept: SURGERY | Age: 79
End: 2019-01-01

## 2019-01-01 ENCOUNTER — OFFICE VISIT (OUTPATIENT)
Dept: SURGERY | Age: 79
End: 2019-01-01

## 2019-01-01 VITALS — BODY MASS INDEX: 28.34 KG/M2 | WEIGHT: 187 LBS | HEIGHT: 68 IN

## 2019-01-01 VITALS — BODY MASS INDEX: 30.13 KG/M2 | HEIGHT: 67 IN | WEIGHT: 192 LBS

## 2019-01-01 DIAGNOSIS — I63.112 CEREBROVASCULAR ACCIDENT (CVA) DUE TO EMBOLISM OF LEFT VERTEBRAL ARTERY (CMD): ICD-10-CM

## 2019-01-01 DIAGNOSIS — C50.911 CARCINOMA OF RIGHT BREAST METASTATIC TO AXILLARY LYMPH NODE (CMD): ICD-10-CM

## 2019-01-01 DIAGNOSIS — I65.02 VERTEBRAL ARTERY THROMBOSIS, LEFT: ICD-10-CM

## 2019-01-01 DIAGNOSIS — C77.3 CARCINOMA OF RIGHT BREAST METASTATIC TO AXILLARY LYMPH NODE (CMD): ICD-10-CM

## 2019-01-01 DIAGNOSIS — C50.911 INFILTRATING DUCTAL CARCINOMA OF RIGHT BREAST (CMD): Primary | ICD-10-CM

## 2019-01-01 DIAGNOSIS — Z79.810 USE OF TAMOXIFEN (NOLVADEX): ICD-10-CM

## 2019-01-01 PROCEDURE — 99213 OFFICE O/P EST LOW 20 MIN: CPT | Performed by: SURGERY

## 2019-01-01 RX ORDER — AMPICILLIN TRIHYDRATE 250 MG
CAPSULE ORAL
COMMUNITY

## 2019-01-01 RX ORDER — ASPIRIN 325 MG
325 TABLET ORAL
COMMUNITY
Start: 2019-01-01

## 2019-01-01 RX ORDER — TRIAMTERENE AND HYDROCHLOROTHIAZIDE 37.5; 25 MG/1; MG/1
TABLET ORAL
COMMUNITY
Start: 2014-05-29

## 2019-01-01 RX ORDER — TOLTERODINE 4 MG/1
CAPSULE, EXTENDED RELEASE ORAL
COMMUNITY
Start: 2019-01-01

## 2019-01-21 RX ORDER — TAMOXIFEN CITRATE 20 MG/1
TABLET ORAL
COMMUNITY
Start: 2018-06-12 | End: 2019-01-01 | Stop reason: SINTOL

## 2019-02-19 PROBLEM — Z79.810 USE OF TAMOXIFEN (NOLVADEX): Status: ACTIVE | Noted: 2019-01-01

## 2019-02-19 PROBLEM — C50.911 CARCINOMA OF RIGHT BREAST METASTATIC TO AXILLARY LYMPH NODE (CMD): Status: ACTIVE | Noted: 2019-01-01

## 2019-02-19 PROBLEM — C77.3 CARCINOMA OF RIGHT BREAST METASTATIC TO AXILLARY LYMPH NODE (CMD): Status: ACTIVE | Noted: 2019-01-01

## 2019-02-19 PROBLEM — C50.911 INFILTRATING DUCTAL CARCINOMA OF RIGHT BREAST (CMD): Status: ACTIVE | Noted: 2019-01-01

## 2019-08-19 PROBLEM — I65.02: Status: ACTIVE | Noted: 2019-01-01

## 2019-08-19 PROBLEM — I63.112 CEREBROVASCULAR ACCIDENT (CVA) DUE TO EMBOLISM OF LEFT VERTEBRAL ARTERY (CMD): Status: ACTIVE | Noted: 2019-01-01

## 2020-02-17 ENCOUNTER — APPOINTMENT (OUTPATIENT)
Dept: SURGERY | Age: 80
End: 2020-02-17

## 2020-02-21 ENCOUNTER — APPOINTMENT (OUTPATIENT)
Dept: SURGERY | Age: 80
End: 2020-02-21

## (undated) NOTE — IP AVS SNAPSHOT
[facility]            (For Outpatient Use Only) Initial Admit Date: 6/29/2018   Inpt/Obs Admit Date: Inpt: 6/29/18 / Obs: N/A   Discharge Date:    Richard Captain:  [de-identified]   MRN: [de-identified]   CSN: [id number]        ENCOUNTER  Patient Class Subscriber ID:  Pt Rel to Subscriber:    Hospital Account Financial Class: Medicare    July 5, 2018

## (undated) NOTE — IP AVS SNAPSHOT
Patient Demographics     Address  [address] [place] [address] [address] Phone  [phone] [facility])      Emergency Contact(s)     Name Relation Home Work PRANAY Daughter [phone]    J Luis Negrete [phone]        A Take 200 mg by mouth every 8 (eight) hours. insulin detemir 100 UNIT/ML Sopn  Commonly known as:  LEVEMIR  Next dose due: Tonight at 9pm      Inject 20 Units into the skin nightly.           Insulin Lispro 100 UNIT/ML Soln  Commonly known as:  HU Flowsheet Row Most Recent Value   Vitals  155/76 Filed at 07/05/2018 0900   Pulse  58 Filed at 07/05/2018 0455   Resp  20 Filed at 07/05/2018 0900   Temp  97.9 °F (36.6 °C) Filed at 07/05/2018 0900   SpO2  98 % Filed at 07/05/2018 0900      Patient's Most Bud et al. CHF 2004: 10              Testing Performed By     Evelyn Fisher Name Director Address Valid Date Range    [address] [facility] Caitlin Santoro. Matt Allred M.D. [facility] [address] [place] [id number] 04/29/14 1547 - Present Problem Relation Age of Onset   • Cancer Mother    • Heart Disease Mother    • Stroke Neg      Social History:  Smoking status: Former Smoker                                                              Packs/day: 0.00      Years: 0.00      Smokeless tobac Constitutional: Negative. HENT: Negative. Eyes: Negative. Respiratory: Negative. Cardiovascular: Negative. Gastrointestinal: Negative. Endocrine: Negative. Musculoskeletal: Negative. Skin: Negative.     Neurological: Positive for spe CONCLUSION:  1. Cardiomegaly. Accentuation chest he is probably secondary to semierect AP and suboptimal gestation. Less likely mild CHF. 2. Scarring and/or atelectasis in the periphery of the right mid and lower lung.  3. Single-chamber pacemaker with lead MD at 06/29/18 1300               06/29/18  1145 06/29/18  1200 06/29/18  1215 06/29/18  1245   BP: 102/75 101/71 (!) 152/132 (!) 152/92   Pulse: 60 60 64 60   Resp: 20 19 19 19   SpO2: 99%            There is no height or weight on file to calculate BMI. [ Current:BP (!) 152/92   Pulse 60   Resp 19   SpO2 99%            Physical Exam   Constitutional: He is cooperative. Non-toxic appearance. He does not have a sickly appearance. He does not appear ill. No distress.    HENT:   Head: Normocephalic and atraumat Filed:  2018  7:43 AM Date of Service:  2018  7:40 AM Status:  Signed    :  Yoel Olmstead MD (Physician)       [facility]    Discharge Summary    Mirela Farris Patient Status:  Inpatient    1940 MRN [id number] Take 325 mg by mouth daily with breakfast.    gabapentin 100 MG Oral Cap  Take 200 mg by mouth every 8 (eight) hours. Insulin Lispro 100 UNIT/ML Subcutaneous Solution  Inject 5 Units into the skin 3 (three) times daily before meals.       !! Insulin Lisp No notes of this type exist for this encounter.